# Patient Record
Sex: FEMALE
[De-identification: names, ages, dates, MRNs, and addresses within clinical notes are randomized per-mention and may not be internally consistent; named-entity substitution may affect disease eponyms.]

---

## 2017-03-24 ENCOUNTER — HOSPITAL ENCOUNTER (INPATIENT)
Dept: HOSPITAL 5 - ED | Age: 47
LOS: 1 days | Discharge: HOME | DRG: 66 | End: 2017-03-25
Attending: INTERNAL MEDICINE | Admitting: INTERNAL MEDICINE
Payer: COMMERCIAL

## 2017-03-24 DIAGNOSIS — I63.9: Primary | ICD-10-CM

## 2017-03-24 DIAGNOSIS — D64.9: ICD-10-CM

## 2017-03-24 DIAGNOSIS — Z79.82: ICD-10-CM

## 2017-03-24 DIAGNOSIS — F17.200: ICD-10-CM

## 2017-03-24 DIAGNOSIS — R29.818: ICD-10-CM

## 2017-03-24 DIAGNOSIS — N92.0: ICD-10-CM

## 2017-03-24 DIAGNOSIS — F14.10: ICD-10-CM

## 2017-03-24 DIAGNOSIS — Z82.49: ICD-10-CM

## 2017-03-24 LAB
ANION GAP SERPL CALC-SCNC: 16 MMOL/L
APTT BLD: 26.9 SEC. (ref 24.2–36.6)
BASOPHILS NFR BLD AUTO: 0.2 % (ref 0–1.8)
BUN SERPL-MCNC: 10 MG/DL (ref 7–17)
BUN/CREAT SERPL: 14.28 %
CALCIUM SERPL-MCNC: 8.3 MG/DL (ref 8.4–10.2)
CHLORIDE SERPL-SCNC: 108.5 MMOL/L (ref 98–107)
CO2 SERPL-SCNC: 21 MMOL/L (ref 22–30)
EOSINOPHIL NFR BLD AUTO: 0.4 % (ref 0–4.3)
GLUCOSE SERPL-MCNC: 102 MG/DL (ref 65–100)
HCT VFR BLD CALC: 26.1 % (ref 30.3–42.9)
HGB BLD-MCNC: 8.1 GM/DL (ref 10.1–14.3)
INR PPP: 0.88 (ref 0.87–1.13)
MCH RBC QN AUTO: 25 PG (ref 28–32)
MCHC RBC AUTO-ENTMCNC: 31 % (ref 30–34)
MCV RBC AUTO: 82 FL (ref 79–97)
PLATELET # BLD: 187 K/MM3 (ref 140–440)
POTASSIUM SERPL-SCNC: 3.7 MMOL/L (ref 3.6–5)
RBC # BLD AUTO: 3.19 M/MM3 (ref 3.65–5.03)
SODIUM SERPL-SCNC: 142 MMOL/L (ref 137–145)
WBC # BLD AUTO: 6.2 K/MM3 (ref 4.5–11)

## 2017-03-24 PROCEDURE — 80053 COMPREHEN METABOLIC PANEL: CPT

## 2017-03-24 PROCEDURE — 85025 COMPLETE CBC W/AUTO DIFF WBC: CPT

## 2017-03-24 PROCEDURE — 86900 BLOOD TYPING SEROLOGIC ABO: CPT

## 2017-03-24 PROCEDURE — 70450 CT HEAD/BRAIN W/O DYE: CPT

## 2017-03-24 PROCEDURE — 93005 ELECTROCARDIOGRAM TRACING: CPT

## 2017-03-24 PROCEDURE — 80061 LIPID PANEL: CPT

## 2017-03-24 PROCEDURE — 86920 COMPATIBILITY TEST SPIN: CPT

## 2017-03-24 PROCEDURE — 82747 ASSAY OF FOLIC ACID RBC: CPT

## 2017-03-24 PROCEDURE — 70551 MRI BRAIN STEM W/O DYE: CPT

## 2017-03-24 PROCEDURE — 84484 ASSAY OF TROPONIN QUANT: CPT

## 2017-03-24 PROCEDURE — 83550 IRON BINDING TEST: CPT

## 2017-03-24 PROCEDURE — 93010 ELECTROCARDIOGRAM REPORT: CPT

## 2017-03-24 PROCEDURE — 86901 BLOOD TYPING SEROLOGIC RH(D): CPT

## 2017-03-24 PROCEDURE — 85610 PROTHROMBIN TIME: CPT

## 2017-03-24 PROCEDURE — 36415 COLL VENOUS BLD VENIPUNCTURE: CPT

## 2017-03-24 PROCEDURE — 82607 VITAMIN B-12: CPT

## 2017-03-24 PROCEDURE — 93880 EXTRACRANIAL BILAT STUDY: CPT

## 2017-03-24 PROCEDURE — 85730 THROMBOPLASTIN TIME PARTIAL: CPT

## 2017-03-24 PROCEDURE — 93306 TTE W/DOPPLER COMPLETE: CPT

## 2017-03-24 PROCEDURE — 80048 BASIC METABOLIC PNL TOTAL CA: CPT

## 2017-03-24 PROCEDURE — 86850 RBC ANTIBODY SCREEN: CPT

## 2017-03-24 PROCEDURE — P9016 RBC LEUKOCYTES REDUCED: HCPCS

## 2017-03-24 PROCEDURE — 85670 THROMBIN TIME PLASMA: CPT

## 2017-03-24 PROCEDURE — 99406 BEHAV CHNG SMOKING 3-10 MIN: CPT

## 2017-03-24 PROCEDURE — 70544 MR ANGIOGRAPHY HEAD W/O DYE: CPT

## 2017-03-24 NOTE — HISTORY AND PHYSICAL REPORT
History of Present Illness


Date of examination: 03/24/17


Date of admission: 


03/24/17 11:19





Chief complaint: 


CC R side numbness  and weakness since 6am through 12noon.


Also facial droop since 6 am which has resolved in couple of hours.





History of present illness: 


47 y/o  female with no significant PMH except congenital Aortic 

stenosis which was repaired at age 12 yrs comes in for Rt upper extremity and 

RLE numbness and weakness since 6 am which resolved completely by 12 noon.Was 

not able to walk.Also had slurred speechwhich has resolved.No CP or SOB.Has 

been doing cocaine off and on for last 10 years.Last use was 3 days ago.No 

recent travel.Not on any blood thenners.








Past History


Past Medical History: other (Aortic stenosis with valvotomy at age 12;

Menorrhagia for 1 mth)


Past Surgical History: Other (Aortic stenosis with valvotomy and D&c)


Social history: lives with family, smoking (1/2 ppd), full code, other (Cocaine 

use for 10 years)


Family history: hypertension





Medications and Allergies


 Allergies











Allergy/AdvReac Type Severity Reaction Status Date / Time


 


No Known Allergies Allergy   Unverified 03/24/17 09:15











 Home Medications











 Medication  Instructions  Recorded  Confirmed  Last Taken  Type


 


No Known Home Medications [No  03/24/17 03/24/17 Unknown History





Reported Home Medications]     














Review of Systems


Constitutional: no weight loss, no weight gain


Ears, nose, mouth and throat: no hoarseness, no sore throat, no swelling in 

mouth, no swelling in throat


Cardiovascular: no chest pain, no orthopnea, no palpitations, no rapid/

irregular heart beat, no edema, no syncope, no lightheadedness, no shortness of 

breath


Respiratory: no shortness of breath, no dyspnea on exertion, no congestion, no 

wheezing


Gastrointestinal: no nausea, no vomiting, no diarrhea, no constipation, no 

change in bowel habits, no hematemesis, no coffee ground emesis


Genitourinary Female: menorrhagia (Excessive vaginal bleeding for 1 mth.Similar 

episode in 2015)


Musculoskeletal: no neck stiffness, no neck pain, no shooting arm pain, no arm 

numbness/tingling, no low back pain, no shooting leg pain, no leg numbness/

tingling, no redness of joints


Integumentary: no rash, no pruritis, no redness, no sores, no wounds, no 

jaundice, no boils, no blisters


Neurological: transient paralysis (RUE and RLE), parathesias


Psychiatric: no anxiety, no depression


Endocrine: no cold intolerance, no heat intolerance, no polydipsia, no polyuria

, no weight change


Hematologic/Lymphatic: no easy bruising, no easy bleeding


Allergic/Immunologic: no urticaria, no allergic rhinitis, no wheezing





Exam





- Physical Exam


Narrative exam: 


Well developed well nourished female lying bed comfortably








- Constitutional


Vitals: 


 











Temp Pulse Resp BP Pulse Ox


 


 98.6 F   80   18   121/70   98 


 


 03/24/17 21:05  03/24/17 21:05  03/24/17 21:05  03/24/17 21:05  03/24/17 21:05











General appearance: Present: no acute distress, well-nourished





- EENT


Eyes: Present: PERRL


ENT: hearing intact, clear oral mucosa





- Neck


Neck: Present: supple, normal ROM





- Respiratory


Respiratory effort: normal


Respiratory: bilateral: CTA





- Cardiovascular


Heart rate: 70


Rhythm: regular


Heart Sounds: Present: S1 & S2.  Absent: rub, click





- Extremities


Extremities: no ischemia, pulses intact, pulses symmetrical, No edema


Peripheral Pulses: within normal limits





- Abdominal


General gastrointestinal: Present: soft, non-tender, non-distended, normal 

bowel sounds


Female genitourinary: Present: normal





- Integumentary


Integumentary: Present: clear, warm, dry





- Musculoskeletal


Musculoskeletal: strength equal bilaterally, other (Reflexes normal power 5/5 

all 4 extremities)





- Psychiatric


Psychiatric: appropriate mood/affect, intact judgment & insight





- Neurologic


Neurologic: CNII-XII intact, moves all extremities





Results





- Labs


CBC & Chem 7: 


 03/25/17 04:01





 03/25/17 04:01


Labs: 


 Laboratory Last Values











WBC  6.2 K/mm3 (4.5-11.0)   03/24/17  09:58    


 


RBC  3.19 M/mm3 (3.65-5.03)  L  03/24/17  09:58    


 


Hgb  8.1 gm/dl (10.1-14.3)  L  03/24/17  09:58    


 


Hct  26.1 % (30.3-42.9)  L  03/24/17  09:58    


 


MCV  82 fl (79-97)   03/24/17  09:58    


 


MCH  25 pg (28-32)  L  03/24/17  09:58    


 


MCHC  31 % (30-34)   03/24/17  09:58    


 


RDW  15.4 % (13.2-15.2)  H  03/24/17  09:58    


 


Plt Count  187 K/mm3 (140-440)   03/24/17  09:58    


 


Lymph % (Auto)  15.5 % (13.4-35.0)   03/24/17  09:58    


 


Mono % (Auto)  9.6 % (0.0-7.3)  H  03/24/17  09:58    


 


Eos % (Auto)  0.4 % (0.0-4.3)   03/24/17  09:58    


 


Baso % (Auto)  0.2 % (0.0-1.8)   03/24/17  09:58    


 


Lymph #  1.0 K/mm3 (1.2-5.4)  L  03/24/17  09:58    


 


Mono #  0.6 K/mm3 (0.0-0.8)   03/24/17  09:58    


 


Eos #  0.0 K/mm3 (0.0-0.4)   03/24/17  09:58    


 


Baso #  0.0 K/mm3 (0.0-0.1)   03/24/17  09:58    


 


Seg Neutrophils %  74.3 % (40.0-70.0)  H  03/24/17  09:58    


 


Seg Neutrophils #  4.6 K/mm3 (1.8-7.7)   03/24/17  09:58    


 


PT  11.8 Sec. (12.2-14.9)  L  03/24/17  09:58    


 


INR  0.88  (0.87-1.13)   03/24/17  09:58    


 


APTT  26.9 Sec. (24.2-36.6)   03/24/17  09:58    


 


Thrombin Time  16.3 Sec. (15.1-19.6)   03/24/17  09:58    


 


Sodium  142 mmol/L (137-145)   03/24/17  09:58    


 


Potassium  3.7 mmol/L (3.6-5.0)   03/24/17  09:58    


 


Chloride  108.5 mmol/L ()  H  03/24/17  09:58    


 


Carbon Dioxide  21 mmol/L (22-30)  L  03/24/17  09:58    


 


Anion Gap  16 mmol/L  03/24/17  09:58    


 


BUN  10 mg/dL (7-17)   03/24/17  09:58    


 


Creatinine  0.7 mg/dL (0.7-1.2)   03/24/17  09:58    


 


Estimated GFR  > 60 ml/min  03/24/17  09:58    


 


BUN/Creatinine Ratio  14.28 %  03/24/17  09:58    


 


Glucose  102 mg/dL ()  H  03/24/17  09:58    


 


Calcium  8.3 mg/dL (8.4-10.2)  L  03/24/17  09:58    


 


Troponin T  < 0.010 ng/mL (0.00-0.029)   03/24/17  09:58    








 Short CBC











  03/24/17 03/25/17 Range/Units





  09:58 04:01 


 


WBC  6.2  5.0  (4.5-11.0)  K/mm3


 


Hgb  8.1 L  7.6 L  (10.1-14.3)  gm/dl


 


Hct  26.1 L  24.5 L  (30.3-42.9)  %


 


Plt Count  187  163  (140-440)  K/mm3








 BMP











  03/24/17 03/25/17





  09:58 04:01


 


Sodium  142  143


 


Potassium  3.7  3.5 L


 


Chloride  108.5 H  109.7 H


 


Carbon Dioxide  21 L  22


 


BUN  10  9


 


Creatinine  0.7  0.7


 


Glucose  102 H  118 H


 


Calcium  8.3 L  7.8 L








 Cardiac Enzymes











  03/24/17 Range/Units





  09:58 


 


Troponin T  < 0.010  (0.00-0.029)  ng/mL








 Liver Function











  03/25/17 Range/Units





  04:01 


 


Total Bilirubin  < 0.2  (0.1-1.2)  mg/dL


 


AST  12  (5-40)  units/L


 


ALT  8  (7-56)  units/L


 


Alkaline Phosphatase  67  ()  units/L


 


Albumin  2.8 L  (3.9-5)  g/dL














- Imaging and Cardiology


CT Scan - head: report reviewed (Negative)





Assessment and Plan


Advance Directives: Yes (Full code)


VTE prophylaxis?: Chemical, Mechanical (SCD's only.No Lovenox b/c of Menorrhagia

)


Plan of care discussed with patient/family: Yes





- Patient Problems


(1) Transient neurologic deficit


Current Visit: Yes   Status: Acute   


Plan to address problem: 


Classic TIA - work up for TIA.-MRI /MRA/ECHO /Carotid duplex ordered.Apn10qt 2 

tabs qd .








(2) Anemia


Current Visit: Yes   Status: Acute   


Qualifiers: 


   Anemia type: iron deficiency   Iron deficiency anemia type: I   Vitamin B12 

deficiency anemia type: V   Folate deficiency anemia type: F   Bone marrow 

failure anemia type: B   Hemolytic anemia type: H   Other causes of anemia: O 


Plan to address problem: 


Sec to Menorrhagia.Iron supplements.Gyn consult ordered.No PRBC at this point.








(3) Cocaine abuse


Current Visit: Yes   Status: Acute   


Plan to address problem: 


Counselled








(4) Nicotine dependence


Current Visit: Yes   Status: Chronic   


Qualifiers: 


   Nicotine product type: cigarettes   Substance use status: S 


Plan to address problem: 


Nicoderm patch ordered








(5) DVT prophylaxis


Current Visit: Yes   Status: Chronic   


Plan to address problem: 


SCD's only

## 2017-03-24 NOTE — ADMIT CRITERIA FORM
Admission Criteria Documentation: 





                             TRANSIENT ISCHEMIC ATTACK (TIA)





Clinical Indications for Admission to Inpatient Care





                                                           (Place 'X' for any 

and all applicable criteria):





Admission is indicated for ANY ONE of the following(1)(2)(3)(4)(5):


[ ]I.     Immediate inpatient procedure is needed (eg, endarterectomy).


[X ]II.    Inpatient admission required rather than observation care (Also use 

Transient Ischemic Attack (TIA): Observation Care 


         Criteria as appropriate) because of ANY ONE of  the following:


          [X ]a)    Focal neurologic signs or symptoms persist or recurring


          [ ]b)    Cardiac arrhythmias of immediate concern


          [ ]c)    Clinically significant cardiac disorder identified that 

requires inpatient care (eg, severe valvular disease, atrial myxoma, 

cardiomyopathy)


          [ ]d)    Hypertension requiring inpatient treatment


          [ ]e)    Parenteral anticoagulation required (eg, alternative forms 

of anticoagulation not appropriate or not feasible) as indicated by ALL of the 

        


                    following(13):


                    [ ]i)   Temporary subtherapeutic anticoagulation 

unacceptable because of high risk of short-term venous or arterial 

thromboembolism due to    


                             ANY ONE of the following(14)(15)(16):             

           


                             [ ]1)    Atrial fibrillation suspected as etiology 

of TIA(17)(18)(19)(20)(21)   


                             [ ]2)    Venous thromboembolism within past 12 

months                                                   


                             [ ]3)    Underlying malignancy


                             [ ]4)    Patient with mechanical cardiac valve(22)(

23)


                             [ ]5)    Underlying hypercoagulable state (eg, 

protein C or protein S deficiency antithrombin deficiency, antiphospholipid 

antibodies)


                             [ ]6)    Patient at temporary high risk of 

thromboembolism (eg, status post orthopedic surgery)


                    [ ]ii)    Contraindications to outpatient use of "bridging" 

agent or alternative oral anticoagulant[B] as indicated by ALL of the following:


                             [ ]1)   Contraindication to outpatient use of low-

molecular-weight heparin as "bridging" agent as indicated by ANY ONE of the  


                                      following(15):


                                      [ ]A. Documented current or history of 

heparin-induced thrombocytopenia(24)


                                      [ ]B. Severe thrombocytopenia (eg, 

platelet count less than 50,000/mm3 (50x109/L)


                                      [ ]C. Documented allergy to heparin, low-

molecular-weight heparin, or pork products


                                      [ ]D. Renal failure (creatinine clearance 

less than 30 mL/min/1.73m2 (0.50mL/sec/1.73m2) or on dialysis)


                                      [ ]E. Inability to manage self-injection (

eg, by patient, caregiver, or visiting nurse)


                            [ ]2)    Contraindication to outpatient use of 

fondaparinux as "bridging" agent as indicated by ANY ONE of the following(25)(26

)(27)(28):


                                      [ ]A. Severe thrombocytopenia (eg, 

platelet count less than 50,000/mm3 (50 x109/L))


                                      [ ]B.Hypersensitivity to fondaparinux, 

related drugs, or product components


                                      [ ]C.Renal failure (creatinine clearance 

less than 30 mL/min/1.73m2 (0.50mL/sec/1.73m2) or on dialysis)


                                      [ ]D.Inability to manage self-injection (

eg, by patient, caregiver, or visiting nurse


                            [ ]3.   Oral direct thrombin inhibitor (eg, 

dabigatran) or oral coagulation factor Xa inhibitor (eg, rivaroxaban, apixaban) 

not 


                                      appropriate as oral anticoagulation (eg, 

indication not appropriate) or contraindicated (eg, hypersensitivity, 

creatinine 


                                      clearance less than 15 mL/min/1.73m2 (

0.25 mL/sec/1.73m2) or on dialysis). 


        [ ]f)    Continuous IV infusion of anticoagulant, platelet inhibitor, 

vasoactive or antiarrhythmia(18)(19)     


        [ ]g)   Other condition, treatment, or monitoring requiring inpatient 

admission





[ ]III. Contraindications and/or Inappropriate clinical situations for 

Observational Care in patients with Transient Ischemic Attack (TIA), 


        when ANY ONE of the following is required:


        [ ]a)   Patient with persistent or severe neurological deficit 24


        [ ]b)   Patient with acute CVA or other identified pathology should be 

admitted to inpatient for further care 25


[ ]IV. General contraindications and/or Inappropriate clinical situations for 

Observational Care in patients


        with Transient Ischemic Attack (TIA), when ANY ONE of the following is 

required:


        [ ]a)   Prediction of prolongation of LOS based on ANY ONE of the 

following may be considered as 


                 a contraindication for observational care 2, 3, 4, 5, 6, 7, 8, 

9, 10, 11


                 [ ]i)    Age > 65 yrs.


                 [ ]ii)   Patient arriving by ambulance


                 [ ]iii)  Patient with high acuity


                 [ ]iv)  Patient requiring vital sign monitoring


                 [ ]v)   Patient on IV medication


         [ ]b)   Systolic blood pressures  180mmHg 3,12


         [ ]c)   Patient with altered mental status including delirium and 

other alteration of consciousness, (3)


         [ ]d)   Patient whose discharge disposition will be to a skilled 

nursing home or rehabilitation home should 


                  not be managed in Emergency Department Observation Unit. CMS 

rule requires 3 days hospital stay before such placement.3,13


         [ ]e)   Patient with failure to thrive due to broad array of 

etiologies 3,16,17


         [ ]f)    Inability to ambulate 3,14








Extended stay beyond goal length of stay may be needed for(4)(30)(32):


 [ ]a)  Parenteral anticoagulation required


 [ ]b)  Dangerous arrhythmia


 [ ]c)  Cardiac valvular disorder, atrial myxoma, cardiomyopathy


 [ ]d)  Uncontrolled severe hypertension


 [ ]e)  Severe carotid stenosis


 [ ]f)   Active comorbidities (eg, heart failure)


 [ ]g)  Extracranial vertebrobasilar disease(29)  


 [ ]h)  Clinical evolution of TIA into cerebrovascular accident (stroke)











The original [employer] content created by [employer] has been revised. 


The portions of thecontent which have been revised are identified through the 

use of italic text or in bold, and [facility]


 has neither  reviewed nor approved the modified material. All other unmodified 

content is copyright  [facility].





Please see references footnoted in the original [facility] edition 

2016


Admission Criteria Met: Yes

## 2017-03-24 NOTE — EMERGENCY DEPARTMENT REPORT
HPI





- General


Chief Complaint: Neuro Symptoms/Deficit


Time Seen by Provider: 03/24/17 10:43





- HPI


HPI: 


Room 4





The patient is a 46-year-old female presenting with a chief complaint of right-

sided weakness.  Patient states she awakened this morning at approximately 06:

00 and was unable to move her right arm or leg.  The patient states she had 

tingling in her entire right side of her body and had developed slurred speech.

  Patient states on her way to the hospital she was able to move her right side 

slightly.  The patient states the weakness has since resolved and now she just 

feels as though her speech is a little slurred and she still has tingling on 

the right side of her body.  Patient denies any previous episodes of the same.  

Of note the patient states she has a history of menorrhagia and has prolonged 

cycles.  The patient states her current cycle has been on for approximately 5 

weeks and is now subsiding.  Patient states she goes through approximately 15 

pads per day when she is on her cycle





Location: Right side


Duration: Intermittent since 06:00


Quality:, Weakness, Tingling


Severity: Moderate


Modifying factors: [see above]


Context: [see above]


Mode of transportation: [not driving]








ED Past Medical Hx





- Past Medical History


Previous Medical History?: Yes


Additional medical history: Aortic stenosis, menorrhagia





- Surgical History


Past Surgical History?: Yes


Hx Open Heart Surgery: Yes (Aortic Valvulotomy at age 12)





- Family History


Family history: no significant





- Social History


Smoking Status: Current Every Day Smoker (1/2 pack per day)


Substance Use Type: Alcohol





- Medications


Home Medications: 


 Home Medications











 Medication  Instructions  Recorded  Confirmed  Last Taken  Type


 


No Known Home Medications [No  03/24/17 03/24/17 Unknown History





Reported Home Medications]     














ED Review of Systems


ROS: 


Stated complaint: RT SIDE NUMBNESS


Other details as noted in HPI





Comment: All other systems reviewed and negative


Constitutional: denies: chills, fever


Eyes: denies: eye pain, eye discharge, vision change


ENT: denies: ear pain, throat pain


Respiratory: denies: cough, shortness of breath, wheezing


Cardiovascular: denies: chest pain, palpitations


Endocrine: no symptoms reported


Gastrointestinal: denies: abdominal pain, nausea, diarrhea


Genitourinary: abnormal menses.  denies: urgency, dysuria, discharge


Musculoskeletal: denies: back pain, joint swelling, arthralgia


Skin: denies: rash, lesions


Neurological: weakness, paresthesias.  denies: headache


Psychiatric: denies: anxiety, depression


Hematological/Lymphatic: denies: easy bleeding, easy bruising





Physical Exam





- Physical Exam


Vital Signs: 


 Vital Signs











  03/24/17 03/24/17 03/24/17





  09:10 10:09 10:10


 


Temperature 97.9 F  


 


Pulse Rate 75 67 61


 


Respiratory 16 14 14





Rate   


 


Blood Pressure 156/76  


 


O2 Sat by Pulse 100 100 





Oximetry   














  03/24/17 03/24/17





  10:21 10:30


 


Temperature  


 


Pulse Rate 70 78


 


Respiratory 12 14





Rate  


 


Blood Pressure 128/76 120/58


 


O2 Sat by Pulse  





Oximetry  











Physical Exam: 





GENERAL: The patient is well-developed well-nourished female lying on stretcher 

not appearing to be in acute distress. []


HEENT: Normocephalic.  Atraumatic.  Extraocular motions are intact.  Patient 

has moist mucous membranes.


NECK: Supple.  Trachea Midline


CHEST/LUNGS: Clear to auscultation.  There is no respiratory distress noted.


HEART/CARDIOVASCULAR: Regular.  There is no tachycardia.  There is no gallop 

rub or murmur.


ABDOMEN: Abdomen is soft, nontender.  Patient has normal bowel sounds.  There 

is no abdominal distention.


SKIN: There is no rash.  There is no edema.  There is no diaphoresis.


NEURO: The patient is awake, alert, and oriented.  The patient is cooperative.  

The patient has no focal neurologic deficits.  The patient has normal speech.  

Cranial nerves II through XII grossly intact, no drift,  5+/5 bilaterally.

  Normal sensation throughout.  Moves all extremities well


MUSCULOSKELETAL:  There is no evidence of acute injury.





ED Course


 Vital Signs











  03/24/17 03/24/17 03/24/17





  09:10 10:09 10:10


 


Temperature 97.9 F  


 


Pulse Rate 75 67 61


 


Respiratory 16 14 14





Rate   


 


Blood Pressure 156/76  


 


O2 Sat by Pulse 100 100 





Oximetry   














  03/24/17 03/24/17





  10:21 10:30


 


Temperature  


 


Pulse Rate 70 78


 


Respiratory 12 14





Rate  


 


Blood Pressure 128/76 120/58


 


O2 Sat by Pulse  





Oximetry  














- Consultations


Consultation #1: 





03/24/17 11:05


OB/GYN paged


03/24/17 11:17


Case discussed with Dr. Calderón- requests that the hospitalist but in a 

consult if they would like him to see the patient while she is in the hospital





ED Medical Decision Making





- Lab Data


Result diagrams: 


 03/24/17 09:58





 03/24/17 09:58





 Laboratory Tests











  03/24/17 03/24/17 03/24/17





  09:58 09:58 09:58


 


WBC  6.2  


 


RBC  3.19 L  


 


Hgb  8.1 L  


 


Hct  26.1 L  


 


MCV  82  


 


MCH  25 L  


 


MCHC  31  


 


RDW  15.4 H  


 


Plt Count  187  


 


Lymph % (Auto)  15.5  


 


Mono % (Auto)  9.6 H  


 


Eos % (Auto)  0.4  


 


Baso % (Auto)  0.2  


 


Lymph #  1.0 L  


 


Mono #  0.6  


 


Eos #  0.0  


 


Baso #  0.0  


 


Seg Neutrophils %  74.3 H  


 


Seg Neutrophils #  4.6  


 


PT   11.8 L 


 


INR   0.88 


 


APTT   26.9 


 


Thrombin Time   


 


Sodium    142


 


Potassium    3.7


 


Chloride    108.5 H


 


Carbon Dioxide    21 L


 


Anion Gap    16


 


BUN    10


 


Creatinine    0.7


 


Estimated GFR    > 60


 


BUN/Creatinine Ratio    14.28


 


Glucose    102 H


 


Calcium    8.3 L


 


Troponin T    < 0.010














  03/24/17





  09:58


 


WBC 


 


RBC 


 


Hgb 


 


Hct 


 


MCV 


 


MCH 


 


MCHC 


 


RDW 


 


Plt Count 


 


Lymph % (Auto) 


 


Mono % (Auto) 


 


Eos % (Auto) 


 


Baso % (Auto) 


 


Lymph # 


 


Mono # 


 


Eos # 


 


Baso # 


 


Seg Neutrophils % 


 


Seg Neutrophils # 


 


PT 


 


INR 


 


APTT 


 


Thrombin Time  16.3


 


Sodium 


 


Potassium 


 


Chloride 


 


Carbon Dioxide 


 


Anion Gap 


 


BUN 


 


Creatinine 


 


Estimated GFR 


 


BUN/Creatinine Ratio 


 


Glucose 


 


Calcium 


 


Troponin T 

















- EKG Data


-: EKG Interpreted by Me


EKG shows normal: sinus rhythm


Rate: normal





- EKG Data


When compared to previous EKG there are: previous EKG unavailable


Interpretation: nonspecific ST-T wave solis (biphasic T waves in leads 2, 3, aVF, 

V6)





- Radiology Data


Radiology results: report reviewed (CT head), image reviewed (CT head)





CT head (read by radiologist)-no acute intracranial CT abnormality with a few 

other findings.





- Differential Diagnosis


CVA, TIA, menorrhagia


Critical care attestation.: 


If time is entered above; I have spent that time in minutes in the direct care 

of this critically ill patient, excluding procedure time.








ED Disposition


Clinical Impression: 


 Transient neurologic deficit, Menorrhagia





Disposition: OP ADMITTED AS IP TO THIS HOSP


Is pt being admited?: Yes


Does the pt Need Aspirin: Yes


Condition: Fair


Referrals: 


PRIMARY CARE,MD [Primary Care Provider] - 3-5 Days


Time of Disposition: 11:04 (hospitalist paged)

## 2017-03-24 NOTE — CAT SCAN REPORT
HEAD CT WITHOUT CONTRAST



INDICATION: Neurologic deficits less than 6 hours or symptoms present 

upon awakening.  98N.



COMPARISON: None similar at this institution.



FINDINGS: Noncontrast head CT demonstrates normal ventricles and sulci 

without acute or recent infarct, hemorrhage, mass effect or midline 

shift. No abnormal extra-axial fluid collections. Posterior fossa 

structures and basilar cisterns appear within normal limits.



Mild left sphenoid sinus mucosal thickening.  Clear remainder imaged 

paranasal sinuses and mastoid air cells.  Slight leftward nasal septal 

bowing. Intact calvarium. Normal scalp soft tissues.  Few missing teeth.



CONCLUSION: No acute intracranial CT abnormality with few other 

findings, as above.



MRI is more sensitive for detection of acute infarct and may be useful 

for further evaluation in the setting of a focal neurologic deficit.



I phoned the above results to Dr. Carey in the ER, 10:10 AM, 3/24/2017.



Thank you for the opportunity to participate in this patient's care.

## 2017-03-25 VITALS — SYSTOLIC BLOOD PRESSURE: 121 MMHG | DIASTOLIC BLOOD PRESSURE: 58 MMHG

## 2017-03-25 LAB
ALBUMIN SERPL-MCNC: 2.8 G/DL (ref 3.9–5)
ALBUMIN/GLOB SERPL: 1 %
ALP SERPL-CCNC: 67 UNITS/L (ref 35–129)
ALT SERPL-CCNC: 8 UNITS/L (ref 7–56)
ANION GAP SERPL CALC-SCNC: 15 MMOL/L
BASOPHILS NFR BLD AUTO: 0.3 % (ref 0–1.8)
BILIRUB SERPL-MCNC: < 0.2 MG/DL (ref 0.1–1.2)
BUN SERPL-MCNC: 9 MG/DL (ref 7–17)
BUN/CREAT SERPL: 12.85 %
CALCIUM SERPL-MCNC: 7.8 MG/DL (ref 8.4–10.2)
CHLORIDE SERPL-SCNC: 109.7 MMOL/L (ref 98–107)
CHOLEST SERPL-MCNC: 114 MG/DL (ref 50–199)
CO2 SERPL-SCNC: 22 MMOL/L (ref 22–30)
EOSINOPHIL NFR BLD AUTO: 1 % (ref 0–4.3)
GLUCOSE SERPL-MCNC: 118 MG/DL (ref 65–100)
HCT VFR BLD CALC: 24.5 % (ref 30.3–42.9)
HDLC SERPL-MCNC: 42 MG/DL (ref 40–59)
HGB BLD-MCNC: 7.6 GM/DL (ref 10.1–14.3)
IRON SERPL-MCNC: 9 UG/DL (ref 37–170)
MCH RBC QN AUTO: 26 PG (ref 28–32)
MCHC RBC AUTO-ENTMCNC: 31 % (ref 30–34)
MCV RBC AUTO: 83 FL (ref 79–97)
PLATELET # BLD: 163 K/MM3 (ref 140–440)
POTASSIUM SERPL-SCNC: 3.5 MMOL/L (ref 3.6–5)
PROT SERPL-MCNC: 5.5 G/DL (ref 6.3–8.2)
RBC # BLD AUTO: 2.96 M/MM3 (ref 3.65–5.03)
SODIUM SERPL-SCNC: 143 MMOL/L (ref 137–145)
TRANSFERRIN SERPL-MCNC: 287 MG/DL (ref 192–382)
TRIGL SERPL-MCNC: 62 MG/DL (ref 2–149)
WBC # BLD AUTO: 5 K/MM3 (ref 4.5–11)

## 2017-03-25 NOTE — MAGNETIC RESONANCE REPORT
MRI scan of brain:



History: Stroke.



Technique: Multiplanar, multisequence images were obtained without 

contrast injection.



Findings:



There is 2 mm focal area of restricted diffusion noted at the mid and 

posterior left basal ganglia. Corresponding hyperintense signal is 

noted on flair imaging and T2 weighted image. No evidence of 

hemorrhage. No extra-axial fluid collection. Normal brainstem and 

cerebellum. Normal sinuses and mastoid air cells.



Impression:



2 small focal areas of acute ischemia left basal ganglia. No hemorrhage.

## 2017-03-25 NOTE — MAGNETIC RESONANCE REPORT
MRA of brain:



History: Stroke.



Findings:



The vessels of [place] are widely patent. No evidence of 

aneurysm, stenosis or occlusion or dissection. Codominant vertebral 

arteries with normal basilar artery. Hypoplastic posterior 

communicating artery with normal posterior cerebral arteries.



Impression:



Essentially negative MRA study.

## 2017-03-25 NOTE — CONSULTATION
History of Present Illness


Consult date: 03/25/17


Requesting physician: JOSE FLOWERS


Reason for consult: menorrhagia


History of present illness: 





patient is a 46 year old G1 with history of cocaine abuse who also has 

menorrhagia which has been an issue intermittently over the years. patient 

claims to have bled for 11 months straight while incarcerated.She has been 

bleeding for about one month at this point, but states that it is slowing down 

now.





Past History


Past Surgical History: no surgical history


Social history: single, other (cocaine abuse)





Medications and Allergies


 Allergies











Allergy/AdvReac Type Severity Reaction Status Date / Time


 


No Known Allergies Allergy   Unverified 03/24/17 09:15











 Home Medications











 Medication  Instructions  Recorded  Confirmed  Last Taken  Type


 


Aspirin [Aspirin BABY CHEW TAB] 81 mg PO QDAY #30 tab.chew 03/25/17  Unknown Rx


 


Norethindrone Acetate [Aygestin] 5 mg PO DAILY #10 tablet 03/25/17  Unknown Rx


 


Simvastatin [Zocor TAB] 20 mg PO QHS #30 tablet 03/25/17  Unknown Rx











Active Meds: 


Active Medications





Acetaminophen (Tylenol)  650 mg PO Q4H PRN


   PRN Reason: Pain MILD(1-3)/Fever >100.5/HA


Aspirin (Baby Aspirin)  81 mg PO QDAY [facility]


   Last Admin: 03/25/17 11:04 Dose:  81 mg


Bisacodyl (Dulcolax)  10 mg ID QDAY PRN


   PRN Reason: Constipation unrelieved by MOM


Bisacodyl (Dulcolax)  10 mg ID QDAY PRN


   PRN Reason: Constipation unrelieved by MOM


Hydromorphone HCl (Dilaudid)  0.5 mg IV Q3H PRN


   PRN Reason: Pain , Severe (7-10)


Dextrose/Sodium Chloride (D5ns)  1,000 mls @ 100 mls/hr IV AS DIRECT [facility]


   Last Admin: 03/24/17 22:23 Dose:  100 mls/hr


Magnesium Hydroxide (Milk Of Magnesia)  30 ml PO Q4H PRN


   PRN Reason: Constipation


Nicotine (Habitrol)  14 mg TD QDAY [facility]


   Last Admin: 03/25/17 11:05 Dose:  14 mg


Ondansetron HCl (Zofran)  4 mg IV Q8H PRN


   PRN Reason: N/V unrelieved by Reglan


Oxycodone/Acetaminophen (Percocet 5/325)  1 tab PO Q6H PRN


   PRN Reason: Pain, Moderate (4-6)


Simvastatin (Zocor)  40 mg PO QHS BOO


Sodium Chloride (Sodium Chloride Flush Syringe 10 Ml)  10 ml IV PRN PRN


   PRN Reason: LINE FLUSH


Zolpidem Tartrate (Ambien)  5 mg PO QHS PRN


   PRN Reason: Insomnia


   Last Admin: 03/24/17 22:23 Dose:  5 mg











Review of Systems


All systems: negative


Genitourinary: vaginal bleeding


Musculoskeletal: arm numbness/tingling


Neurological: weakness, parathesias





- Vital Signs


Vital signs: 


 Vital Signs











Temp Pulse Resp BP Pulse Ox


 


 97.9 F   75   16   156/76   100 


 


 03/24/17 09:10  03/24/17 09:10  03/24/17 09:10  03/24/17 09:10  03/24/17 09:10








 











Temp Pulse Resp BP Pulse Ox


 


 97.8 F   66   18   136/70   99 


 


 03/25/17 11:34  03/25/17 11:34  03/25/17 11:34  03/25/17 11:34  03/25/17 11:34














- Physical Exam


Breasts: 


Cardiovascular: Regular rate, Normal S1, Normal S2


Lungs: Positive: Clear to auscultation, Normal air movement


Abdomen: Positive: normal appearance, soft, normal bowel sounds.  Negative: 

distention, tenderness


Vulva: both: normal


Vagina: Positive: normal moisture.  Negative: discharge


Cervix: Negative: lesion, discharge


Uterus: Positive: normal size, normal contour


Adnexa: both: normal


Anus/Rectum: Positive: normal perianal skin, heme negative.  Negative: rectal 

mass, hemorrhoids


Extremities: 


Deep Tendon Reflex Grade: Normal +2





Results


Result Diagrams: 


 03/25/17 04:01





 03/25/17 04:01


 Abnormal lab results











  03/25/17 03/25/17 03/25/17 Range/Units





  04:01 04:01 08:15 


 


RBC  2.96 L    (3.65-5.03)  M/mm3


 


Hgb  7.6 L    (10.1-14.3)  gm/dl


 


Hct  24.5 L    (30.3-42.9)  %


 


MCH  26 L    (28-32)  pg


 


RDW  15.3 H    (13.2-15.2)  %


 


Mono % (Auto)  10.1 H    (0.0-7.3)  %


 


Potassium   3.5 L   (3.6-5.0)  mmol/L


 


Chloride   109.7 H   ()  mmol/L


 


Glucose   118 H   ()  mg/dL


 


Calcium   7.8 L   (8.4-10.2)  mg/dL


 


Iron    9 L  ()  ug/dL


 


Total Protein   5.5 L   (6.3-8.2)  g/dL


 


Albumin   2.8 L   (3.9-5)  g/dL


 


Crossmatch     














  03/25/17 Range/Units





  11:09 


 


RBC   (3.65-5.03)  M/mm3


 


Hgb   (10.1-14.3)  gm/dl


 


Hct   (30.3-42.9)  %


 


MCH   (28-32)  pg


 


RDW   (13.2-15.2)  %


 


Mono % (Auto)   (0.0-7.3)  %


 


Potassium   (3.6-5.0)  mmol/L


 


Chloride   ()  mmol/L


 


Glucose   ()  mg/dL


 


Calcium   (8.4-10.2)  mg/dL


 


Iron   ()  ug/dL


 


Total Protein   (6.3-8.2)  g/dL


 


Albumin   (3.9-5)  g/dL


 


Crossmatch  See Detail  








All other labs normal.








Assessment and Plan





46 year old with perimenopausal DUB. Patient has not had gyn workup in many 

years and would need to do so do find cause of DUB, which is likely hormonal. 

Patient was initially admitted for symptoms consistent with TIA and would not 

be a candidate for oral contraceptives to control bleeding. Patient advised to 

seek gyn consult and care as outpatient. Patient also advised to continue use 

of iron as outpatient. patient also given script for Aygestin to use as needed 

should heavy and prolonged bleeding return.





Thank you for this consult.

## 2017-03-25 NOTE — DISCHARGE SUMMARY
Providers





- Providers


Date of Admission: 


03/24/17 11:19





Date of discharge: 03/25/17


Attending physician: 


BRUCE RUIZ MD





 





03/24/17 21:28


Consult to Physician [CONS] Routine 


   Consulting Provider: RICK SALAZAR


   Reason For Exam: TIA


   Place consult to:: Dr. Salazar


   Notified:: Sarah RN


   Phone number called:: Ext [phone]


   Was contact made?: Yes


   If yes, spoke with:: Voicemail with consult info left for Krista Nava


   Time called:: 08:15





03/24/17 21:40


Occupational Therapy Evaluate and Treat [CONS] Routine 


   Comment: 


   Reason For Exam: Neuro deficits


Physical Therapy Evaluation and Treat [CONS] Routine 


   Comment: 


   Reason For Exam: Neuro deficits





03/25/17 07:59


Consult to Physician [CONS] Routine 


   Consulting Provider: JULIANNA HART


   Reason For Exam: Menorrhagia


   Place consult to:: Dr. Hart


   Notified:: Sarah RN


   Phone number called:: [phone]


   Was contact made?: Yes


   If yes, spoke with:: Dr. Monson


   Time called:: 08:23











Primary care physician: 


PRIMARY CARE MD








Hospitalization


Reason for admission: TIA


Condition: Stable


Hospital course: 


46 year old with perimenopausal DUB.





CC R side numbness  and weakness since 6am through 12noon.


Also facial droop since 6 am which has resolved in couple of hours.





History of present illness: 


47 y/o  female with no significant PMH except congenital Aortic 

stenosis which was repaired at age 12 yrs comes in for Rt upper extremity and 

RLE numbness and weakness since 6 am which resolved completely by 12 noon.Was 

not able to walk .Also had slurred speech which has resolved.No CP or SOB.Has 

been doing cocaine off and on for last 10 years. Last use was 3 days ago. No 

recent travel. Not on any blood thenners.  Imaging studies including CT was 

initially negative MRI did show 2 small areas of ischemia to the left basal 

ganglia.  This is likely secondary to the vasoconstrictive effect of cocaine.  

We did have extensive discussion with the patient about compliance.  She 

verbalized understanding.  She understands that she is to take aspirin daily 

for life and statins.  Her cholesterol remarkably is good.  She is to follow 

with cardiology and neurology outpatient.  Her facial droop has resolved her 

speech is back to normal.  She was seen by OB/GYN " Patient has not had gyn 

workup in many years and would need to do so do find cause of DUB, which is 

likely hormonal. Patient was initially admitted for symptoms consistent with 

TIA and would not be a candidate for oral contraceptives to control bleeding. 

Patient advised to seek gyn consult and care as outpatient. Patient also 

advised to continue use of iron as outpatient. patient also given script for 

Aygestin to use as needed should heavy and prolonged bleeding return"





Discharge diagnosis


(1) CVA


(2) Anemia secondary to menorrhagia


(3) Cocaine abuse


(4) Nicotine dependence





Disposition: DISCHARGED TO HOME OR SELFCARE


Time spent for discharge: 35 mins





Core Measure Documentation





- Palliative Care


Palliative Care/ Comfort Measures: Not Applicable





- Core Measures


Any of the following diagnoses?: stroke





- VTE Discharge Requirements


Deep Vein Thrombosis/Pulmonary Embolism Present on Admission: No





- Stroke Discharge Requirements


Statin for LDL = or >70 mg/dl on DC: No


Anticoag for atrial fib/atrial flutter: Not Applicable


Antithrombotic for ischemic stroke: Yes





Exam





- Physical Exam


Narrative exam: 


 VITAL SIGNS:  Reviewed.    


GENERAL:  The patient appeared well nourished and normally developed. Vital 

signs as documented.


HEAD:  No signs of head trauma.


EYES:  Pupils are equal.  Extraocular motions intact.  


EARS:  Hearing grossly intact.


MOUTH:  Oropharynx is normal. 


NECK:  No adenopathy, no JVD.   


CHEST:  Chest with clear breath sounds bilaterally.  No wheezes, rales, or 

rhonchi.  


CARDIAC:  Regular rate and rhythm.  S1 and S2, without murmurs, gallops, or 

rubs.


VASCULAR:  No Edema.  Peripheral pulses normal and equal in all extremities.


ABDOMEN:  Soft, without detectable tenderness.  No sign of distention.  No   

rebound or guarding, and no masses palpated.   Bowel Sounds normal.


MUSCULOSKELETAL:  Good range of motion of all major joints. Extremities without 

clubbing, cyanosis or edema.  


NEUROLOGIC EXAM:  Alert and oriented x 3.  No focal sensory or strength 

deficits.   Speech normal.  Follows commands.


PSYCHIATRIC:  Mood normal.


SKIN: Neck and upper extremity tattoos.














- Constitutional


Vitals: 


 











Temp Pulse Resp BP Pulse Ox


 


 97.7 F   71   18   94/56   97 


 


 03/25/17 07:35  03/25/17 07:35  03/25/17 07:35  03/25/17 07:35  03/25/17 10:00














Plan


Activity: advance as tolerated, fall precautions


Diet: low fat


Special Instructions: smoking cessation, other (must quite tobacco and cocain)


Additional Instructions: Cardiologist and neurology evaluation outpatient in 

one week per PCPs choice


Follow up with: 


[facility] [Provider Group] - 7 Days


JULIANNA HART MD [Staff Physician] - 7 Days


PRIMARY CARE,MD [Primary Care Provider] - 3-5 Days


Prescriptions: 


Simvastatin [Zocor TAB] 20 mg PO QHS #30 tablet


Aspirin [Aspirin BABY CHEW TAB] 81 mg PO QDAY #30 tab.chew


Norethindrone Acetate [Aygestin] 5 mg PO DAILY #10 tablet

## 2018-08-08 ENCOUNTER — HOSPITAL ENCOUNTER (EMERGENCY)
Dept: HOSPITAL 5 - ED | Age: 48
Discharge: LEFT BEFORE DISCHARGE | End: 2018-08-08
Payer: SELF-PAY

## 2018-08-08 DIAGNOSIS — Z53.21: ICD-10-CM

## 2018-08-08 DIAGNOSIS — T63.301A: Primary | ICD-10-CM
